# Patient Record
Sex: FEMALE | Race: WHITE | URBAN - METROPOLITAN AREA
[De-identification: names, ages, dates, MRNs, and addresses within clinical notes are randomized per-mention and may not be internally consistent; named-entity substitution may affect disease eponyms.]

---

## 2017-07-13 ENCOUNTER — IMPORTED ENCOUNTER (OUTPATIENT)
Dept: URBAN - METROPOLITAN AREA CLINIC 9 | Facility: CLINIC | Age: 67
End: 2017-07-13

## 2017-09-06 NOTE — PATIENT DISCUSSION
CYST RUL: STABLE. OPTION OF GETTING REMOVED OR LEAVING IT ALONE. PATIENT WANTS TO PROCEED WITH REMOVAL. REFER TO DR. Manuel Swan.

## 2018-02-15 ENCOUNTER — IMPORTED ENCOUNTER (OUTPATIENT)
Dept: URBAN - METROPOLITAN AREA CLINIC 9 | Facility: CLINIC | Age: 68
End: 2018-02-15

## 2018-07-02 ENCOUNTER — IMPORTED ENCOUNTER (OUTPATIENT)
Dept: URBAN - METROPOLITAN AREA CLINIC 9 | Facility: CLINIC | Age: 68
End: 2018-07-02

## 2019-08-08 ENCOUNTER — IMPORTED ENCOUNTER (OUTPATIENT)
Dept: URBAN - METROPOLITAN AREA CLINIC 9 | Facility: CLINIC | Age: 69
End: 2019-08-08

## 2020-08-17 ENCOUNTER — IMPORTED ENCOUNTER (OUTPATIENT)
Dept: URBAN - METROPOLITAN AREA CLINIC 9 | Facility: CLINIC | Age: 70
End: 2020-08-17

## 2021-02-24 NOTE — PATIENT DISCUSSION
CATARACTS, OU: VISUALLY SIGNIFICANT. OPTION OF SURGERY VERSUS FOLLOWING VERSUS UPDATING GLASSES DISCUSSED. RBA'S DISCUSSED, PATIENT UNDERSTANDS AND DESIRES SURGERY TO INCREASE VISION FOR SEING STREET SIGNS AND WORKING ON THE COMPUTER.  SCHEDULE CATARACT SURGERY/PRE-OP .

## 2021-02-24 NOTE — PATIENT DISCUSSION
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

## 2021-03-08 NOTE — PATIENT DISCUSSION
CATARACT SX OS 03/23/2021: RBA'S DISCUSSED, PMH DISCUSSES MONOVISION - OS FOR NEAR- PT HAS NATURAL MONOVISION (OD=DISTANCE, OS = NEAR) AND UNDERSTANDS AND DESIRES OS FOR NEAR (OD FOR DISTANCE). PATIENT UNDERSTANDS AND DESIRES TO PROCEED WITH SURGERY. CONSENT READ AND SIGNED. PATIENT DESIRES STANDARD FOR NEAR OS.

## 2021-03-24 NOTE — PATIENT DISCUSSION
Continue: prednisoln wy-divtgwbu-lpnqjxe (prednisoln rp-hixcnzyr-geddxex): drops: 1-0.5-0.075% 1 drop four times a day as directed opht 03-

## 2021-04-07 NOTE — PATIENT DISCUSSION
Continue: prednisoln ks-dxwealsh-atlqkhw (prednisoln vw-ixtqoggz-mcfandl): drops: 1-0.5-0.075% 1 drop four times a day as directed opht 03-

## 2021-04-07 NOTE — PATIENT DISCUSSION
Pre-Op 2nd Eye Counseling: The patient has noticed an improvement in their visual symptoms in the operative eye. The patient complains of decreased vision in the fellow eye when driving, reading and working on the computer. It was explained to the patient that the decision to proceed with cataract surgery in the fellow eye is entirely a separate decision from the surgical eye. All of the same risks, benefits and alternatives ere reviewed with the patient again. The patient does feel the vision in the non-operative eye is limiting their daily activities and elects to proceed with cataract surgery in the right eye. Schedule cataract surgery/ pre op OD.

## 2021-09-08 ENCOUNTER — IMPORTED ENCOUNTER (OUTPATIENT)
Dept: URBAN - METROPOLITAN AREA CLINIC 9 | Facility: CLINIC | Age: 71
End: 2021-09-08

## 2021-09-08 PROBLEM — H25.13: Noted: 2021-09-08

## 2021-09-08 PROBLEM — H04.123: Noted: 2021-09-08

## 2021-10-17 ASSESSMENT — VISUAL ACUITY
OS_SC: 20/20 - SN
OD_SC: 20/25 -2 SN
OS_SC: 20/20 - SN
OS_CC: 20/20 SN
OS_CC: 20/20 SN
OD_CC: 20/25 SN
OS_SC: 20/20 -2 SN
OS_CC: 20/20 SN
OS_SC: 20/20 - SN
OD_SC: 20/25 -2 SN
OD_CC: 20/20 -2 SN
OD_CC: 20/20 - SN
OD_CC: 20/20 SN
OD_CC: 20/20 SN
OS_CC: 20/25 SN
OD_SC: 20/25 - SN
OD_CC: 20/20 - SN
OS_SC: 20/20 SN
OS_SC: 20/20 SN
OS_CC: 20/20 SN
OD_CC: 20/20 SN
OS_CC: 20/20 SN
OD_SC: 20/25 - SN
OD_SC: 20/30 -2 SN
OD_SC: 20/30 + SN

## 2021-10-17 ASSESSMENT — KERATOMETRY
OD_AXISANGLE2_DEGREES: 101
OD_AXISANGLE2_DEGREES: 27
OD_AXISANGLE2_DEGREES: 136
OS_K1POWER_DIOPTERS: 45
OS_AXISANGLE2_DEGREES: 106
OD_AXISANGLE_DEGREES: 120
OS_K2POWER_DIOPTERS: 44.25
OD_K1POWER_DIOPTERS: 45
OD_K2POWER_DIOPTERS: 45.25
OD_AXISANGLE2_DEGREES: 30
OS_AXISANGLE2_DEGREES: 4
OD_K1POWER_DIOPTERS: 44.75
OD_K1POWER_DIOPTERS: 45
OS_AXISANGLE2_DEGREES: 25
OS_AXISANGLE_DEGREES: 94
OD_AXISANGLE2_DEGREES: 75
OS_AXISANGLE_DEGREES: 165
OD_AXISANGLE_DEGREES: 46
OD_K2POWER_DIOPTERS: 45.25
OD_K2POWER_DIOPTERS: 45.25
OS_AXISANGLE2_DEGREES: 115
OS_K1POWER_DIOPTERS: 44.75
OD_AXISANGLE_DEGREES: 11
OD_K1POWER_DIOPTERS: 45
OS_K2POWER_DIOPTERS: 45.25
OS_K2POWER_DIOPTERS: 45
OS_K1POWER_DIOPTERS: 45
OD_K1POWER_DIOPTERS: 45
OS_AXISANGLE_DEGREES: 25
OS_AXISANGLE_DEGREES: 16
OD_AXISANGLE_DEGREES: 117
OS_K2POWER_DIOPTERS: 45.25
OD_AXISANGLE_DEGREES: 165
OS_K1POWER_DIOPTERS: 45
OS_K1POWER_DIOPTERS: 45
OS_K2POWER_DIOPTERS: 45.25
OS_AXISANGLE_DEGREES: 115
OD_K2POWER_DIOPTERS: 45
OD_K2POWER_DIOPTERS: 45.25
OS_AXISANGLE2_DEGREES: 75

## 2021-10-17 ASSESSMENT — TONOMETRY
OD_IOP_MMHG: 12
OD_IOP_MMHG: 14
OD_IOP_MMHG: 10
OS_IOP_MMHG: 12
OD_IOP_MMHG: 12
OS_IOP_MMHG: 10
OD_IOP_MMHG: 14
OS_IOP_MMHG: 12
OD_IOP_MMHG: 12
OS_IOP_MMHG: 12
OS_IOP_MMHG: 13

## 2021-10-27 NOTE — PATIENT DISCUSSION
RBA'S DISCUSSED, PATIENT UNDERSTANDS AND DESIRES TO PROCEED WITH SURGERY.  CONSENT READ AND SIGNED.  PATIENT DESIRES STANDARD FOR DISTANCE.

## 2021-10-27 NOTE — PATIENT DISCUSSION
PATIENT INSTRUCTED TO RETURN TO PCP FOR CONTINUED BLOOD SUGAR MONITORING AND RETURN FOR FOLLOW-UP AS SCHEDULED FOR DILATED EXAM.

## 2021-10-27 NOTE — PATIENT DISCUSSION
VISUALLY SIGNIFICANT. OPTION OF SURGERY VERSUS FOLLOWING VERSUS UPDATING GLASSES DISCUSSED.  RBA'S DISCUSSED, PATIENT UNDERSTANDS AND DESIRES SURGERY TO INCREASE VISION FOR WATCHING TV AND DRIVING.  SCHEDULE CATARACT SURGERY/PRE-OP OD.

## 2021-11-10 NOTE — PATIENT DISCUSSION
The patient was instructed in the proper use of post-operative eye drops: Pred-Gati-Brom (1%/0.5%/0.075%) 4 times per day for 1 week, then reduce to 3 times per day for 1 week, then reduce to 2 times per day for 1 week, then reduce to 1 time per day for 1 week, then discontinue unless otherwise instructed.

## 2022-06-22 RX ORDER — TRIAMTERENE AND HYDROCHLOROTHIAZIDE 37.5; 25 MG/1; MG/1
TABLET ORAL
COMMUNITY
Start: 2017-11-13

## 2022-06-22 RX ORDER — LORATADINE 10 MG/1
TABLET ORAL
COMMUNITY

## 2022-06-22 RX ORDER — LEVOTHYROXINE SODIUM 0.05 MG/1
TABLET ORAL
COMMUNITY
Start: 2021-02-03

## 2022-06-22 RX ORDER — EPINEPHRINE 0.3 MG/.3ML
INJECTION SUBCUTANEOUS
COMMUNITY
Start: 2019-03-18

## 2022-06-22 RX ORDER — MECLIZINE HYDROCHLORIDE 25 MG/1
TABLET ORAL
COMMUNITY

## 2022-09-19 ENCOUNTER — ESTABLISHED PATIENT (OUTPATIENT)
Dept: URBAN - METROPOLITAN AREA CLINIC 4 | Facility: CLINIC | Age: 72
End: 2022-09-19

## 2022-09-19 DIAGNOSIS — H17.822: ICD-10-CM

## 2022-09-19 DIAGNOSIS — H25.13: ICD-10-CM

## 2022-09-19 DIAGNOSIS — H04.123: ICD-10-CM

## 2022-09-19 PROCEDURE — 92015 DETERMINE REFRACTIVE STATE: CPT

## 2022-09-19 PROCEDURE — 92014 COMPRE OPH EXAM EST PT 1/>: CPT

## 2022-09-19 ASSESSMENT — VISUAL ACUITY
OD_GLARE: 20/80
OS_SC: 20/20-2
OD_SC: 20/25-2
OS_GLARE: 20/60
OU_SC: 20/20-2

## 2022-09-19 ASSESSMENT — KERATOMETRY
OD_AXISANGLE2_DEGREES: 94
OS_AXISANGLE_DEGREES: 114
OS_K1POWER_DIOPTERS: 45.0
OD_K1POWER_DIOPTERS: 45.0
OD_K2POWER_DIOPTERS: 45.5
OS_AXISANGLE2_DEGREES: 24
OD_AXISANGLE_DEGREES: 4
OS_K2POWER_DIOPTERS: 45.25

## 2022-09-19 ASSESSMENT — TONOMETRY
OD_IOP_MMHG: 12
OS_IOP_MMHG: 12

## 2023-01-18 NOTE — PATIENT DISCUSSION
PATIENT INSTRUCTED TO RETURN TO PCP FOR CONTINUED BLOODSUGAR MONITORING AND RETURN FOR FOLLOW-UP AS SCHEDULED FOR DILATED EXAM.

## 2023-09-26 ENCOUNTER — ESTABLISHED PATIENT (OUTPATIENT)
Dept: URBAN - METROPOLITAN AREA CLINIC 4 | Facility: CLINIC | Age: 73
End: 2023-09-26

## 2023-09-26 DIAGNOSIS — H40.1410: ICD-10-CM

## 2023-09-26 DIAGNOSIS — H25.13: ICD-10-CM

## 2023-09-26 DIAGNOSIS — H17.822: ICD-10-CM

## 2023-09-26 DIAGNOSIS — H04.123: ICD-10-CM

## 2023-09-26 PROCEDURE — 92015 DETERMINE REFRACTIVE STATE: CPT

## 2023-09-26 PROCEDURE — 92133 CPTRZD OPH DX IMG PST SGM ON: CPT

## 2023-09-26 PROCEDURE — 92014 COMPRE OPH EXAM EST PT 1/>: CPT

## 2023-09-26 ASSESSMENT — VISUAL ACUITY
OD_SC: 20/30-1
OD_GLARE: 20/40-1
OS_GLARE: 20/40
OS_SC: 20/40+2
OD_CC: J7
OS_CC: J7
OU_SC: 20/25-1

## 2023-09-26 ASSESSMENT — KERATOMETRY
OD_AXISANGLE2_DEGREES: 99
OS_K1POWER_DIOPTERS: 44.25
OD_K1POWER_DIOPTERS: 45.25
OS_K2POWER_DIOPTERS: 45.75
OD_K2POWER_DIOPTERS: 45.50
OD_AXISANGLE_DEGREES: 009
OS_AXISANGLE_DEGREES: 65
OS_AXISANGLE2_DEGREES: 155

## 2023-09-26 ASSESSMENT — TONOMETRY
OD_IOP_MMHG: 13
OS_IOP_MMHG: 14

## 2024-01-26 ENCOUNTER — DIAGNOSTICS ONLY (OUTPATIENT)
Facility: LOCATION | Age: 74
End: 2024-01-26

## 2024-01-26 DIAGNOSIS — H40.1411: ICD-10-CM

## 2024-01-26 PROCEDURE — 92083 EXTENDED VISUAL FIELD XM: CPT

## 2024-04-29 ENCOUNTER — FOLLOW UP (OUTPATIENT)
Facility: LOCATION | Age: 74
End: 2024-04-29

## 2024-04-29 DIAGNOSIS — H40.1411: ICD-10-CM

## 2024-04-29 DIAGNOSIS — H25.13: ICD-10-CM

## 2024-04-29 DIAGNOSIS — H17.822: ICD-10-CM

## 2024-04-29 DIAGNOSIS — H04.123: ICD-10-CM

## 2024-04-29 PROCEDURE — 92014 COMPRE OPH EXAM EST PT 1/>: CPT

## 2024-04-29 PROCEDURE — 92133 CPTRZD OPH DX IMG PST SGM ON: CPT

## 2024-04-29 PROCEDURE — 92015 DETERMINE REFRACTIVE STATE: CPT

## 2024-04-29 ASSESSMENT — VISUAL ACUITY
OD_GLARE: 20/50
OS_GLARE: 20/50
OS_CC: J1
OS_SC: 20/20-2
OD_CC: J1
OD_SC: 20/25
OU_SC: 20/20

## 2024-04-29 ASSESSMENT — TONOMETRY
OD_IOP_MMHG: 15
OS_IOP_MMHG: 15

## 2024-04-29 ASSESSMENT — KERATOMETRY
OS_K2POWER_DIOPTERS: 45.25
OD_AXISANGLE2_DEGREES: 90
OD_K1POWER_DIOPTERS: 45.50
OD_K2POWER_DIOPTERS: 45.50
OD_AXISANGLE_DEGREES: 180
OS_K1POWER_DIOPTERS: 45.25
OS_AXISANGLE2_DEGREES: 90
OS_AXISANGLE_DEGREES: 180

## 2024-10-28 ENCOUNTER — EMERGENCY VISIT (OUTPATIENT)
Facility: LOCATION | Age: 74
End: 2024-10-28

## 2024-10-28 DIAGNOSIS — H04.123: ICD-10-CM

## 2024-10-28 PROCEDURE — 92012 INTRM OPH EXAM EST PATIENT: CPT

## 2024-10-28 RX ORDER — TOBRAMYCIN / DEXAMETHASONE 3; .5 MG/ML; MG/ML: 1 SUSPENSION/ DROPS OPHTHALMIC

## 2025-06-13 ENCOUNTER — DIAGNOSTICS ONLY (OUTPATIENT)
Age: 75
End: 2025-06-13

## 2025-06-13 DIAGNOSIS — H40.1411: ICD-10-CM

## 2025-06-13 PROCEDURE — 92083 EXTENDED VISUAL FIELD XM: CPT
